# Patient Record
Sex: FEMALE | Race: WHITE | Employment: FULL TIME | URBAN - METROPOLITAN AREA
[De-identification: names, ages, dates, MRNs, and addresses within clinical notes are randomized per-mention and may not be internally consistent; named-entity substitution may affect disease eponyms.]

---

## 2023-12-12 ENCOUNTER — OFFICE VISIT (OUTPATIENT)
Age: 23
End: 2023-12-12
Payer: COMMERCIAL

## 2023-12-12 VITALS
WEIGHT: 208 LBS | RESPIRATION RATE: 20 BRPM | OXYGEN SATURATION: 97 % | TEMPERATURE: 98 F | HEART RATE: 89 BPM | DIASTOLIC BLOOD PRESSURE: 80 MMHG | HEIGHT: 66 IN | SYSTOLIC BLOOD PRESSURE: 118 MMHG | BODY MASS INDEX: 33.43 KG/M2

## 2023-12-12 DIAGNOSIS — Z12.4 CERVICAL CANCER SCREENING: ICD-10-CM

## 2023-12-12 DIAGNOSIS — E28.2 PCOS (POLYCYSTIC OVARIAN SYNDROME): ICD-10-CM

## 2023-12-12 DIAGNOSIS — Z76.89 ENCOUNTER TO ESTABLISH CARE: ICD-10-CM

## 2023-12-12 DIAGNOSIS — F41.9 ANXIETY: ICD-10-CM

## 2023-12-12 DIAGNOSIS — F39 MOOD DISORDER (HCC): ICD-10-CM

## 2023-12-12 DIAGNOSIS — L65.9 HAIR LOSS: ICD-10-CM

## 2023-12-12 DIAGNOSIS — E66.09 CLASS 1 OBESITY DUE TO EXCESS CALORIES WITHOUT SERIOUS COMORBIDITY WITH BODY MASS INDEX (BMI) OF 33.0 TO 33.9 IN ADULT: ICD-10-CM

## 2023-12-12 DIAGNOSIS — E03.9 ACQUIRED HYPOTHYROIDISM: Primary | ICD-10-CM

## 2023-12-12 DIAGNOSIS — K58.2 IRRITABLE BOWEL SYNDROME WITH BOTH CONSTIPATION AND DIARRHEA: ICD-10-CM

## 2023-12-12 PROBLEM — E66.811 CLASS 1 OBESITY DUE TO EXCESS CALORIES WITHOUT SERIOUS COMORBIDITY WITH BODY MASS INDEX (BMI) OF 33.0 TO 33.9 IN ADULT: Status: ACTIVE | Noted: 2023-12-12

## 2023-12-12 PROCEDURE — 1036F TOBACCO NON-USER: CPT | Performed by: NURSE PRACTITIONER

## 2023-12-12 PROCEDURE — G8484 FLU IMMUNIZE NO ADMIN: HCPCS | Performed by: NURSE PRACTITIONER

## 2023-12-12 PROCEDURE — G8417 CALC BMI ABV UP PARAM F/U: HCPCS | Performed by: NURSE PRACTITIONER

## 2023-12-12 PROCEDURE — 99204 OFFICE O/P NEW MOD 45 MIN: CPT | Performed by: NURSE PRACTITIONER

## 2023-12-12 PROCEDURE — G8427 DOCREV CUR MEDS BY ELIG CLIN: HCPCS | Performed by: NURSE PRACTITIONER

## 2023-12-12 RX ORDER — LEVOTHYROXINE SODIUM 0.05 MG/1
TABLET ORAL
COMMUNITY
Start: 2023-12-04

## 2023-12-12 RX ORDER — SPIRONOLACTONE 50 MG/1
50 TABLET, FILM COATED ORAL
COMMUNITY
Start: 2023-09-12 | End: 2023-12-11

## 2023-12-12 RX ORDER — NORETHINDRONE ACETATE AND ETHINYL ESTRADIOL AND FERROUS FUMARATE 1.5-30(21)
KIT ORAL
COMMUNITY
Start: 2023-09-21

## 2023-12-12 RX ORDER — ARIPIPRAZOLE 2 MG/1
TABLET ORAL
COMMUNITY
Start: 2023-11-08

## 2023-12-12 RX ORDER — SPIRONOLACTONE 50 MG/1
50 TABLET, FILM COATED ORAL DAILY
COMMUNITY

## 2023-12-12 RX ORDER — VORTIOXETINE 20 MG/1
TABLET, FILM COATED ORAL
COMMUNITY
Start: 2023-11-08

## 2023-12-12 SDOH — ECONOMIC STABILITY: FOOD INSECURITY: WITHIN THE PAST 12 MONTHS, YOU WORRIED THAT YOUR FOOD WOULD RUN OUT BEFORE YOU GOT MONEY TO BUY MORE.: NEVER TRUE

## 2023-12-12 SDOH — ECONOMIC STABILITY: FOOD INSECURITY: WITHIN THE PAST 12 MONTHS, THE FOOD YOU BOUGHT JUST DIDN'T LAST AND YOU DIDN'T HAVE MONEY TO GET MORE.: NEVER TRUE

## 2023-12-12 SDOH — ECONOMIC STABILITY: HOUSING INSECURITY
IN THE LAST 12 MONTHS, WAS THERE A TIME WHEN YOU DID NOT HAVE A STEADY PLACE TO SLEEP OR SLEPT IN A SHELTER (INCLUDING NOW)?: NO

## 2023-12-12 SDOH — ECONOMIC STABILITY: INCOME INSECURITY: HOW HARD IS IT FOR YOU TO PAY FOR THE VERY BASICS LIKE FOOD, HOUSING, MEDICAL CARE, AND HEATING?: NOT HARD AT ALL

## 2023-12-12 ASSESSMENT — PATIENT HEALTH QUESTIONNAIRE - PHQ9
SUM OF ALL RESPONSES TO PHQ9 QUESTIONS 1 & 2: 0
SUM OF ALL RESPONSES TO PHQ QUESTIONS 1-9: 0
SUM OF ALL RESPONSES TO PHQ QUESTIONS 1-9: 0
2. FEELING DOWN, DEPRESSED OR HOPELESS: 0
SUM OF ALL RESPONSES TO PHQ QUESTIONS 1-9: 0
1. LITTLE INTEREST OR PLEASURE IN DOING THINGS: 0
SUM OF ALL RESPONSES TO PHQ QUESTIONS 1-9: 0

## 2023-12-14 DIAGNOSIS — F41.9 ANXIETY: ICD-10-CM

## 2023-12-14 DIAGNOSIS — E28.2 PCOS (POLYCYSTIC OVARIAN SYNDROME): ICD-10-CM

## 2023-12-14 DIAGNOSIS — L65.9 HAIR LOSS: ICD-10-CM

## 2023-12-14 DIAGNOSIS — E66.09 CLASS 1 OBESITY DUE TO EXCESS CALORIES WITHOUT SERIOUS COMORBIDITY WITH BODY MASS INDEX (BMI) OF 33.0 TO 33.9 IN ADULT: ICD-10-CM

## 2023-12-14 DIAGNOSIS — K58.2 IRRITABLE BOWEL SYNDROME WITH BOTH CONSTIPATION AND DIARRHEA: ICD-10-CM

## 2023-12-14 DIAGNOSIS — E03.9 ACQUIRED HYPOTHYROIDISM: ICD-10-CM

## 2023-12-14 LAB
ALBUMIN SERPL-MCNC: 3.3 G/DL (ref 3.5–5)
ALBUMIN/GLOB SERPL: 0.9 (ref 1.1–2.2)
ALP SERPL-CCNC: 62 U/L (ref 45–117)
ALT SERPL-CCNC: 27 U/L (ref 12–78)
ANION GAP SERPL CALC-SCNC: 7 MMOL/L (ref 5–15)
AST SERPL-CCNC: 16 U/L (ref 15–37)
BASOPHILS # BLD: 0 K/UL (ref 0–0.1)
BASOPHILS NFR BLD: 0 % (ref 0–1)
BILIRUB SERPL-MCNC: 0.3 MG/DL (ref 0.2–1)
BUN SERPL-MCNC: 16 MG/DL (ref 6–20)
BUN/CREAT SERPL: 17 (ref 12–20)
CALCIUM SERPL-MCNC: 9.1 MG/DL (ref 8.5–10.1)
CHLORIDE SERPL-SCNC: 106 MMOL/L (ref 97–108)
CHOLEST SERPL-MCNC: 267 MG/DL
CO2 SERPL-SCNC: 26 MMOL/L (ref 21–32)
CREAT SERPL-MCNC: 0.92 MG/DL (ref 0.55–1.02)
DIFFERENTIAL METHOD BLD: ABNORMAL
EOSINOPHIL # BLD: 0.2 K/UL (ref 0–0.4)
EOSINOPHIL NFR BLD: 3 % (ref 0–7)
ERYTHROCYTE [DISTWIDTH] IN BLOOD BY AUTOMATED COUNT: 12 % (ref 11.5–14.5)
EST. AVERAGE GLUCOSE BLD GHB EST-MCNC: 88 MG/DL
GLOBULIN SER CALC-MCNC: 3.7 G/DL (ref 2–4)
GLUCOSE SERPL-MCNC: 86 MG/DL (ref 65–100)
HBA1C MFR BLD: 4.7 % (ref 4–5.6)
HCT VFR BLD AUTO: 43.6 % (ref 35–47)
HDLC SERPL-MCNC: 55 MG/DL
HDLC SERPL: 4.9 (ref 0–5)
HGB BLD-MCNC: 14.8 G/DL (ref 11.5–16)
IMM GRANULOCYTES # BLD AUTO: 0 K/UL (ref 0–0.04)
IMM GRANULOCYTES NFR BLD AUTO: 1 % (ref 0–0.5)
LDLC SERPL CALC-MCNC: ABNORMAL MG/DL (ref 0–100)
LDLC SERPL DIRECT ASSAY-MCNC: 138 MG/DL (ref 0–100)
LYMPHOCYTES # BLD: 2.6 K/UL (ref 0.8–3.5)
LYMPHOCYTES NFR BLD: 35 % (ref 12–49)
MCH RBC QN AUTO: 29.5 PG (ref 26–34)
MCHC RBC AUTO-ENTMCNC: 33.9 G/DL (ref 30–36.5)
MCV RBC AUTO: 86.9 FL (ref 80–99)
MONOCYTES # BLD: 0.6 K/UL (ref 0–1)
MONOCYTES NFR BLD: 8 % (ref 5–13)
NEUTS SEG # BLD: 4.1 K/UL (ref 1.8–8)
NEUTS SEG NFR BLD: 53 % (ref 32–75)
NRBC # BLD: 0 K/UL (ref 0–0.01)
NRBC BLD-RTO: 0 PER 100 WBC
PLATELET # BLD AUTO: 369 K/UL (ref 150–400)
PMV BLD AUTO: 8.8 FL (ref 8.9–12.9)
POTASSIUM SERPL-SCNC: 4.6 MMOL/L (ref 3.5–5.1)
PROT SERPL-MCNC: 7 G/DL (ref 6.4–8.2)
RBC # BLD AUTO: 5.02 M/UL (ref 3.8–5.2)
SODIUM SERPL-SCNC: 139 MMOL/L (ref 136–145)
TRIGL SERPL-MCNC: 581 MG/DL
TSH SERPL DL<=0.05 MIU/L-ACNC: 2.83 UIU/ML (ref 0.36–3.74)
VLDLC SERPL CALC-MCNC: ABNORMAL MG/DL
WBC # BLD AUTO: 7.5 K/UL (ref 3.6–11)

## 2023-12-14 RX ORDER — OMEGA-3-ACID ETHYL ESTERS 1 G/1
2 CAPSULE, LIQUID FILLED ORAL 2 TIMES DAILY
Qty: 60 CAPSULE | Refills: 3 | Status: SHIPPED | OUTPATIENT
Start: 2023-12-14

## 2023-12-15 ENCOUNTER — OFFICE VISIT (OUTPATIENT)
Age: 23
End: 2023-12-15
Payer: COMMERCIAL

## 2023-12-15 ENCOUNTER — TELEPHONE (OUTPATIENT)
Age: 23
End: 2023-12-15

## 2023-12-15 VITALS
SYSTOLIC BLOOD PRESSURE: 127 MMHG | DIASTOLIC BLOOD PRESSURE: 79 MMHG | WEIGHT: 208 LBS | HEART RATE: 114 BPM | HEIGHT: 66 IN | BODY MASS INDEX: 33.43 KG/M2

## 2023-12-15 DIAGNOSIS — N92.6 IRREGULAR MENSES: Primary | ICD-10-CM

## 2023-12-15 DIAGNOSIS — E28.2 PCOS (POLYCYSTIC OVARIAN SYNDROME): ICD-10-CM

## 2023-12-15 PROCEDURE — G8427 DOCREV CUR MEDS BY ELIG CLIN: HCPCS | Performed by: STUDENT IN AN ORGANIZED HEALTH CARE EDUCATION/TRAINING PROGRAM

## 2023-12-15 PROCEDURE — 99204 OFFICE O/P NEW MOD 45 MIN: CPT | Performed by: STUDENT IN AN ORGANIZED HEALTH CARE EDUCATION/TRAINING PROGRAM

## 2023-12-15 PROCEDURE — G8484 FLU IMMUNIZE NO ADMIN: HCPCS | Performed by: STUDENT IN AN ORGANIZED HEALTH CARE EDUCATION/TRAINING PROGRAM

## 2023-12-15 PROCEDURE — G8417 CALC BMI ABV UP PARAM F/U: HCPCS | Performed by: STUDENT IN AN ORGANIZED HEALTH CARE EDUCATION/TRAINING PROGRAM

## 2023-12-15 PROCEDURE — 1036F TOBACCO NON-USER: CPT | Performed by: STUDENT IN AN ORGANIZED HEALTH CARE EDUCATION/TRAINING PROGRAM

## 2023-12-15 NOTE — PROGRESS NOTES
OB/GYN Problem VIsit    HPI  Jessika Das is a ,  20 y.o. female who presents for a problem visit. Carries diagnosis of polycystic ovarian system as diagnosed in her teens. Patient previously informed of elevated testosterone level. Reports that when she is not on oral contraceptives she will have very infrequent menses, approximately 3 a year. Has been on combined OCPs for several years. On OCPs menses are regular and light in flow. Denies any pelvic pain. Struggles with unwanted hair growth. Previously prescribed spironolactone and this seems to have helped moderately. Has not undergone laser hair removal.  Previously attempted multiple strategies for weight loss. Met with dietitian, trialed semaglutide with primary care provider and had significant nausea. Reports approximately 60 pound weight gain over the past couple years. Also carries diagnosis of hypothyroidism on Synthroid, TSH tested with primary care provider last week within normal limits. Past Medical History:   Diagnosis Date    Anxiety     High cholesterol     Hypothyroidism     IBS (irritable bowel syndrome)     PCOS (polycystic ovarian syndrome)      History reviewed. No pertinent surgical history. Social History     Occupational History    Not on file   Tobacco Use    Smoking status: Never    Smokeless tobacco: Never   Vaping Use    Vaping Use: Never used   Substance and Sexual Activity    Alcohol use: Yes     Alcohol/week: 1.0 standard drink of alcohol     Types: 1 Drinks containing 0.5 oz of alcohol per week    Drug use: Never    Sexual activity: Yes     Partners: Male     Birth control/protection: OCP     Comment: Zeina Pruett     Family History   Problem Relation Age of Onset    Hypothyroidism Mother     High Blood Pressure Father     Elevated Lipids Father     Prostate Cancer Father        No Known Allergies  Prior to Admission medications    Medication Sig Start Date End Date Taking?  Authorizing Provider   omega-3 acid

## 2023-12-15 NOTE — TELEPHONE ENCOUNTER
Called patient to inform them of their last labs. PCP has responded     \"Please call patient. Labs are back and triglycerides are very high  and often comes with psych medications. I sent Prescription to pharmacy to start an Omega 3 fish oil . This will reduce cholesterol and triglycerides. Anabela Rahman \"    Patient stated that she has been on a prescription fish oil from her cardio doctor and isn't sure if new fish oil prescription will help otherwise patient understood.

## 2023-12-15 NOTE — PROGRESS NOTES
Vic Bazan is a 20 y.o. female presents for a problem visit. Chief Complaint   Patient presents with    Polycystic Ovarian Syndrome     Patient's last menstrual period was 12/06/2023 (within days). Birth Control: OCP (estrogen/progesterone). Last Pap: normal obtained 3 year(s) ago, per pt. The patient is reporting having PCOS  The patient has weight gain, irregular cycles (she will not have a menstrual cycle without hormones), fatigue, hair loss and unwanted hair. She has not had any testing previously to officially be dx with PCOS. The patient desires something to help regulate/control PCOS sx.         Examination chaperoned by Yefri High MA.

## 2024-01-11 NOTE — PROGRESS NOTES
Jennie Handley is a 23 y.o. female returns for an annual exam     Chief Complaint   Patient presents with    Annual Exam       Patient's last menstrual period was 01/02/2024 (exact date).  Her periods are moderate in flow and usually regular with a 26-32 day interval with 3-7 day duration while on OCPs.  She does not have dysmenorrhea.  Problems: wants to discuss previous hormone lab results.  Birth Control: OCP (estrogen/progesterone).  Last Pap: normal obtained 3 year(s) ago, per pt.    She does not have a history of ISABEL 2, 3 or cervical cancer.   With regard to the Gardisil vaccine, she has received all 3 injections      1. Have you been to the ER, urgent care clinic, or hospitalized since your last visit? No    2. Have you seen or consulted any other health care providers outside of the [facility] since your last visit? No    Examination chaperoned by Patricia Saab MA.

## 2024-01-12 ENCOUNTER — OFFICE VISIT (OUTPATIENT)
Age: 24
End: 2024-01-12
Payer: COMMERCIAL

## 2024-01-12 VITALS
WEIGHT: 211 LBS | BODY MASS INDEX: 34.06 KG/M2 | HEART RATE: 102 BPM | DIASTOLIC BLOOD PRESSURE: 76 MMHG | SYSTOLIC BLOOD PRESSURE: 118 MMHG

## 2024-01-12 DIAGNOSIS — Z11.3 SCREENING EXAMINATION FOR STD (SEXUALLY TRANSMITTED DISEASE): ICD-10-CM

## 2024-01-12 DIAGNOSIS — E28.2 PCOS (POLYCYSTIC OVARIAN SYNDROME): ICD-10-CM

## 2024-01-12 DIAGNOSIS — Z01.419 ENCOUNTER FOR GYNECOLOGICAL EXAMINATION WITHOUT ABNORMAL FINDING: Primary | ICD-10-CM

## 2024-01-12 DIAGNOSIS — Z12.4 CERVICAL CANCER SCREENING: ICD-10-CM

## 2024-01-12 PROCEDURE — 99395 PREV VISIT EST AGE 18-39: CPT | Performed by: STUDENT IN AN ORGANIZED HEALTH CARE EDUCATION/TRAINING PROGRAM

## 2024-01-12 PROCEDURE — G8484 FLU IMMUNIZE NO ADMIN: HCPCS | Performed by: STUDENT IN AN ORGANIZED HEALTH CARE EDUCATION/TRAINING PROGRAM

## 2024-01-12 RX ORDER — MINOXIDIL 2.5 MG/1
TABLET ORAL
COMMUNITY
Start: 2023-12-14

## 2024-01-12 RX ORDER — NORETHINDRONE ACETATE AND ETHINYL ESTRADIOL AND FERROUS FUMARATE 1.5-30(21)
KIT ORAL
Qty: 3 PACKET | Refills: 3 | Status: SHIPPED | OUTPATIENT
Start: 2024-01-12

## 2024-01-12 RX ORDER — SPIRONOLACTONE 50 MG/1
50 TABLET, FILM COATED ORAL 2 TIMES DAILY
Qty: 60 TABLET | Refills: 11 | Status: SHIPPED | OUTPATIENT
Start: 2024-01-12

## 2024-01-12 NOTE — PROGRESS NOTES
Annual exam ages 18-39    Jennie Handley is a ,  23 y.o. female   Patient's last menstrual period was 2024 (exact date).    She presents for her annual checkup.     She is having problems - struggles with weight gain.  Unwanted hair growth on chin.    With regard to the Gardasil vaccine, series completed.      Menstrual status:    Her periods are light, moderate in flow. She is using three to five pads or tampons per day, usually regular and last 26-30 days.    She does not have dysmenorrhea.    She reports no premenstrual symptoms.    Contraception:    The current method of family planning is oral contraceptives (estrogen/progesterone).    Sexual history:    She  reports being sexually active and has had partner(s) who are male. She reports using the following method of birth control/protection: OCP.    Medical conditions:    Since her last annual GYN exam about 3 years ago, she has not the following changes in her health history: none.     Surgical history confirmed with patient.  has no past surgical history on file.    Pap and Mammogram History:    Her most recent Pap smear was was normal, obtained 3 year(s) ago.    The patient has never had a mammogram.    Breast Cancer History/Substance Abuse: negative    Past Medical History:   Diagnosis Date    Anxiety     High cholesterol     Hypothyroidism     IBS (irritable bowel syndrome)     PCOS (polycystic ovarian syndrome)      History reviewed. No pertinent surgical history.    Current Outpatient Medications   Medication Sig Dispense Refill    minoxidil (LONITEN) 2.5 MG tablet       JUNEL FE 1.5/30 1.5-30 MG-MCG tablet TAKE 1 TABLET BY MOUTH EVERY DAY FOR 28 DAYS 90 DAYS 3 packet 3    spironolactone (ALDACTONE) 50 MG tablet Take 1 tablet by mouth 2 times daily 60 tablet 11    omega-3 acid ethyl esters (LOVAZA) 1 g capsule Take 2 capsules by mouth 2 times daily 60 capsule 3    ARIPiprazole (ABILIFY) 2 MG tablet       levothyroxine (SYNTHROID) 50 MCG  Doxepin Pregnancy And Lactation Text: This medication is Pregnancy Category C and it isn't known if it is safe during pregnancy. It is also excreted in breast milk and breast feeding isn't recommended.

## 2024-01-13 LAB
HBV SURFACE AG SER QL: 0.4 INDEX
HBV SURFACE AG SER QL: NEGATIVE
HCV AB SER IA-ACNC: 0.16 INDEX
HCV AB SERPL QL IA: NONREACTIVE
HIV 1+2 AB+HIV1 P24 AG SERPL QL IA: NONREACTIVE
HIV 1/2 RESULT COMMENT: NORMAL

## 2024-01-15 LAB — RPR SER QL: NONREACTIVE

## 2024-01-16 LAB
., LABCORP: NORMAL
C TRACH RRNA CVX QL NAA+PROBE: NEGATIVE
CYTOLOGIST CVX/VAG CYTO: NORMAL
CYTOLOGY CVX/VAG DOC CYTO: NORMAL
CYTOLOGY CVX/VAG DOC THIN PREP: NORMAL
DX ICD CODE: NORMAL
Lab: NORMAL
N GONORRHOEA RRNA CVX QL NAA+PROBE: NEGATIVE
OTHER STN SPEC: NORMAL
STAT OF ADQ CVX/VAG CYTO-IMP: NORMAL
T VAGINALIS RRNA SPEC QL NAA+PROBE: NEGATIVE

## 2024-03-11 ENCOUNTER — TELEPHONE (OUTPATIENT)
Age: 24
End: 2024-03-11

## 2024-03-11 NOTE — TELEPHONE ENCOUNTER
----- Message from Di Lora MA sent at 3/6/2024 11:52 AM EST -----  Subject: Message to Provider    QUESTIONS  Information for Provider? Requesting recent OV and labs be sent to [facility] [phone]. They need records before appt   ---------------------------------------------------------------------------  --------------  CALL BACK INFO  [phone]; Do not leave any message, patient will call back for answer  ---------------------------------------------------------------------------  --------------  SCRIPT ANSWERS  Relationship to Patient? Self

## 2024-03-11 NOTE — TELEPHONE ENCOUNTER
Last office visit information and recent labs faxed [facility], Per patients request. Faxed to number provided: [phone]    Isaias KU LPN

## 2024-03-12 ENCOUNTER — OFFICE VISIT (OUTPATIENT)
Age: 24
End: 2024-03-12
Payer: COMMERCIAL

## 2024-03-12 VITALS
RESPIRATION RATE: 18 BRPM | SYSTOLIC BLOOD PRESSURE: 123 MMHG | DIASTOLIC BLOOD PRESSURE: 85 MMHG | HEIGHT: 66 IN | BODY MASS INDEX: 34.65 KG/M2 | HEART RATE: 97 BPM | WEIGHT: 215.6 LBS | OXYGEN SATURATION: 96 % | TEMPERATURE: 97.6 F

## 2024-03-12 DIAGNOSIS — E28.2 PCOS (POLYCYSTIC OVARIAN SYNDROME): ICD-10-CM

## 2024-03-12 DIAGNOSIS — F39 MOOD DISORDER (HCC): ICD-10-CM

## 2024-03-12 DIAGNOSIS — E66.09 CLASS 1 OBESITY DUE TO EXCESS CALORIES WITHOUT SERIOUS COMORBIDITY WITH BODY MASS INDEX (BMI) OF 33.0 TO 33.9 IN ADULT: ICD-10-CM

## 2024-03-12 DIAGNOSIS — E03.9 ACQUIRED HYPOTHYROIDISM: Primary | ICD-10-CM

## 2024-03-12 PROCEDURE — G8484 FLU IMMUNIZE NO ADMIN: HCPCS | Performed by: NURSE PRACTITIONER

## 2024-03-12 PROCEDURE — 99213 OFFICE O/P EST LOW 20 MIN: CPT | Performed by: NURSE PRACTITIONER

## 2024-03-12 PROCEDURE — G8417 CALC BMI ABV UP PARAM F/U: HCPCS | Performed by: NURSE PRACTITIONER

## 2024-03-12 PROCEDURE — G8427 DOCREV CUR MEDS BY ELIG CLIN: HCPCS | Performed by: NURSE PRACTITIONER

## 2024-03-12 PROCEDURE — 1036F TOBACCO NON-USER: CPT | Performed by: NURSE PRACTITIONER

## 2024-03-12 RX ORDER — HYOSCYAMINE SULFATE 0.125 MG
TABLET,DISINTEGRATING ORAL
COMMUNITY
Start: 2024-02-23

## 2024-03-12 ASSESSMENT — PATIENT HEALTH QUESTIONNAIRE - PHQ9
1. LITTLE INTEREST OR PLEASURE IN DOING THINGS: 0
SUM OF ALL RESPONSES TO PHQ QUESTIONS 1-9: 0
SUM OF ALL RESPONSES TO PHQ9 QUESTIONS 1 & 2: 0
2. FEELING DOWN, DEPRESSED OR HOPELESS: 0

## 2024-03-12 ASSESSMENT — ENCOUNTER SYMPTOMS
DIARRHEA: 1
SHORTNESS OF BREATH: 0
COUGH: 0
CONSTIPATION: 0
ABDOMINAL PAIN: 1

## 2024-03-12 NOTE — PROGRESS NOTES
I have reviewed all needed documentation in preparation for visit. Verified patient by name and date of birth    Chief Complaint   Patient presents with    Follow-up     Follow up from last appointment, needs referral for endo , wants to discuss weight loss       \"Have you been to the ER, urgent care clinic since your last visit?  Hospitalized since your last visit?\"    NO    “Have you seen or consulted any other health care providers outside of [facility] since your last visit?”    Yes, Gastro  Dr Harrell  about 6 weeks ago               Vitals:    03/12/24 0800   BP: 123/85   Site: Left Upper Arm   Position: Sitting   Cuff Size: Medium Adult   Pulse: 97   Resp: 18   Temp: 97.6 °F (36.4 °C)   TempSrc: Temporal   SpO2: 96%   Weight: 97.8 kg (215 lb 9.6 oz)   Height: 1.676 m (5' 6\")       Health Maintenance Due   Topic Date Due    Hepatitis B vaccine (1 of 3 - 3-dose series) Never done    COVID-19 Vaccine (1) Never done    Varicella vaccine (1 of 2 - 2-dose childhood series) Never done    HPV vaccine (1 - 2-dose series) Never done    DTaP/Tdap/Td vaccine (1 - Tdap) Never done       Malathi Cramer LPN

## 2024-03-12 NOTE — PROGRESS NOTES
Jennie Handley is a 23 y.o. female  Chief Complaint   Patient presents with    Follow-up     Follow up from last appointment, needs referral for endo , wants to discuss weight loss       Vitals:    03/12/24 0800   BP: 123/85   Pulse: 97   Resp: 18   Temp: 97.6 °F (36.4 °C)   SpO2: 96%      Wt Readings from Last 3 Encounters:   03/12/24 97.8 kg (215 lb 9.6 oz)   01/12/24 95.7 kg (211 lb)   12/15/23 94.3 kg (208 lb)     BMI Readings from Last 3 Encounters:   03/12/24 34.80 kg/m²   01/12/24 34.06 kg/m²   12/15/23 33.57 kg/m²     Health Maintenance Due   Topic Date Due    Hepatitis B vaccine (1 of 3 - 3-dose series) Never done    COVID-19 Vaccine (1) Never done    Varicella vaccine (1 of 2 - 2-dose childhood series) Never done    HPV vaccine (1 - 2-dose series) Never done    DTaP/Tdap/Td vaccine (1 - Tdap) Never done     HPI  Patient here to follow up  Hypothyroid  Lab Results   Component Value Date    ORP4RHI 2.83 12/14/2023     Requesting referral for Endocrinology.     Concerns today  Weight loss- GYN said to discuss.   No special diet. Eats clean/ no red meat or Pork. Not Alpha Gal   x yrs.  3-5 times.     Has seen dieticians. All exercise regimens.   Used to be thin 115 lbs age 16.  The weight gain was from panic attacks/ and medications.     Wt Readings from Last 3 Encounters:   03/12/24 97.8 kg (215 lb 9.6 oz)   01/12/24 95.7 kg (211 lb)   12/15/23 94.3 kg (208 lb)       Anxiety- psychiatry/ Trintellex/ Abilify. Has genetic testing for this medication.  PTSD.      Has PCOS. GYN- already    Aldactone   Seen by GI tested for Alpha Gal/ Intestinal bacterial overgrowth..  All normal Waiting for records. ? Colonoscopy in future, Dr. Harrell.       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

## 2024-03-28 ENCOUNTER — OFFICE VISIT (OUTPATIENT)
Age: 24
End: 2024-03-28
Payer: COMMERCIAL

## 2024-03-28 VITALS
BODY MASS INDEX: 34.97 KG/M2 | SYSTOLIC BLOOD PRESSURE: 124 MMHG | HEART RATE: 92 BPM | HEIGHT: 66 IN | DIASTOLIC BLOOD PRESSURE: 81 MMHG | RESPIRATION RATE: 18 BRPM | OXYGEN SATURATION: 97 % | WEIGHT: 217.6 LBS | TEMPERATURE: 97.7 F

## 2024-03-28 DIAGNOSIS — B37.2 CANDIDIASIS, INTERTRIGO: Primary | ICD-10-CM

## 2024-03-28 DIAGNOSIS — L30.9 DERMATITIS: ICD-10-CM

## 2024-03-28 PROCEDURE — G8427 DOCREV CUR MEDS BY ELIG CLIN: HCPCS | Performed by: NURSE PRACTITIONER

## 2024-03-28 PROCEDURE — 99213 OFFICE O/P EST LOW 20 MIN: CPT | Performed by: NURSE PRACTITIONER

## 2024-03-28 PROCEDURE — 1036F TOBACCO NON-USER: CPT | Performed by: NURSE PRACTITIONER

## 2024-03-28 PROCEDURE — G8484 FLU IMMUNIZE NO ADMIN: HCPCS | Performed by: NURSE PRACTITIONER

## 2024-03-28 PROCEDURE — G8417 CALC BMI ABV UP PARAM F/U: HCPCS | Performed by: NURSE PRACTITIONER

## 2024-03-28 RX ORDER — CLOTRIMAZOLE AND BETAMETHASONE DIPROPIONATE 10; .64 MG/G; MG/G
CREAM TOPICAL
Qty: 45 G | Refills: 1 | Status: SHIPPED | OUTPATIENT
Start: 2024-03-28

## 2024-03-28 NOTE — PROGRESS NOTES
Jennie Handley is a 23 y.o. female  Chief Complaint   Patient presents with    Other     Painfull spot in left armpit about 5 or 6 days  painful to the touch       Vitals:    03/28/24 1135   BP: 124/81   Pulse: 92   Resp: 18   Temp: 97.7 °F (36.5 °C)   SpO2: 97%      Wt Readings from Last 3 Encounters:   03/28/24 98.7 kg (217 lb 9.6 oz)   03/12/24 97.8 kg (215 lb 9.6 oz)   01/12/24 95.7 kg (211 lb)     BMI Readings from Last 3 Encounters:   03/28/24 35.12 kg/m²   03/12/24 34.80 kg/m²   01/12/24 34.06 kg/m²     Health Maintenance Due   Topic Date Due    Hepatitis B vaccine (1 of 3 - 3-dose series) Never done    COVID-19 Vaccine (1) Never done    Varicella vaccine (1 of 2 - 2-dose childhood series) Never done    HPV vaccine (1 - 2-dose series) Never done    DTaP/Tdap/Td vaccine (1 - Tdap) Never done     HPI  Patient here with painful lesion under left arm  Started 5 days ago.   Noticed because of Arm pain/ pain to touch.  Stings like sunburn.  Red to purple. Not getting bigger.   No drainage  No streaking or fever.  No history of skin infections    ROS  Review of Systems   Constitutional:  Negative for fever.   Musculoskeletal:  Negative for myalgias.   Skin:  Positive for rash.   Neurological:  Negative for headaches.   Hematological:  Does not bruise/bleed easily.   Psychiatric/Behavioral:  Negative for dysphoric mood.        EXAM  Physical Exam  Vitals and nursing note reviewed.   Constitutional:       General: She is not in acute distress.     Appearance: Normal appearance.   HENT:      Head: Normocephalic and atraumatic.   Eyes:      Extraocular Movements: Extraocular movements intact.      Pupils: Pupils are equal, round, and reactive to light.   Cardiovascular:      Rate and Rhythm: Normal rate.   Pulmonary:      Effort: Pulmonary effort is normal.   Musculoskeletal:         General: Normal range of motion.      Cervical back: Normal range of motion.   Skin:     Findings: Rash present.      Comments: 3 cm

## 2024-03-28 NOTE — PROGRESS NOTES
I have reviewed all needed documentation in preparation for visit. Verified patient by name and date of birth    Chief Complaint   Patient presents with    Other     Painfull spot in left armpit about 5 or 6 days  painful to the touch       \"Have you been to the ER, urgent care clinic since your last visit?  Hospitalized since your last visit?\"    NO    “Have you seen or consulted any other health care providers outside of [facility] since your last visit?”    Yes, dermatologist, Dr Church            Click Here for Release of Records Request    Vitals:    03/28/24 1135   BP: 124/81   Site: Right Upper Arm   Position: Sitting   Cuff Size: Large Adult   Pulse: 92   Resp: 18   Temp: 97.7 °F (36.5 °C)   TempSrc: Temporal   SpO2: 97%   Weight: 98.7 kg (217 lb 9.6 oz)   Height: 1.676 m (5' 6\")       Health Maintenance Due   Topic Date Due    Hepatitis B vaccine (1 of 3 - 3-dose series) Never done    COVID-19 Vaccine (1) Never done    Varicella vaccine (1 of 2 - 2-dose childhood series) Never done    HPV vaccine (1 - 2-dose series) Never done    DTaP/Tdap/Td vaccine (1 - Tdap) Never done       Malathi Cramer LPN

## 2024-05-09 ENCOUNTER — OFFICE VISIT (OUTPATIENT)
Age: 24
End: 2024-05-09
Payer: COMMERCIAL

## 2024-05-09 VITALS
BODY MASS INDEX: 34.55 KG/M2 | OXYGEN SATURATION: 98 % | HEART RATE: 90 BPM | WEIGHT: 215 LBS | SYSTOLIC BLOOD PRESSURE: 120 MMHG | HEIGHT: 66 IN | RESPIRATION RATE: 18 BRPM | DIASTOLIC BLOOD PRESSURE: 79 MMHG | TEMPERATURE: 98 F

## 2024-05-09 DIAGNOSIS — J02.9 VIRAL PHARYNGITIS: Primary | ICD-10-CM

## 2024-05-09 PROBLEM — F41.9 ANXIETY: Status: ACTIVE | Noted: 2024-05-09

## 2024-05-09 LAB
GROUP A STREP ANTIGEN, POC: NEGATIVE
VALID INTERNAL CONTROL, POC: YES

## 2024-05-09 PROCEDURE — G8417 CALC BMI ABV UP PARAM F/U: HCPCS | Performed by: NURSE PRACTITIONER

## 2024-05-09 PROCEDURE — 1036F TOBACCO NON-USER: CPT | Performed by: NURSE PRACTITIONER

## 2024-05-09 PROCEDURE — 87880 STREP A ASSAY W/OPTIC: CPT | Performed by: NURSE PRACTITIONER

## 2024-05-09 PROCEDURE — G8427 DOCREV CUR MEDS BY ELIG CLIN: HCPCS | Performed by: NURSE PRACTITIONER

## 2024-05-09 PROCEDURE — 99213 OFFICE O/P EST LOW 20 MIN: CPT | Performed by: NURSE PRACTITIONER

## 2024-05-09 ASSESSMENT — ENCOUNTER SYMPTOMS
RHINORRHEA: 0
TROUBLE SWALLOWING: 1
SORE THROAT: 1
VOICE CHANGE: 0
COUGH: 0

## 2024-05-09 NOTE — PROGRESS NOTES
I have reviewed all needed documentation in preparation for visit. Verified patient by name and date of birth  Chief Complaint   Patient presents with    OTHER     Start 2 weeks ago- throat is been worse since monday-        Vitals:    05/09/24 0829   BP: 120/79   Site: Right Upper Arm   Position: Sitting   Cuff Size: Medium Adult   Pulse: 90   Resp: 18   Temp: 98 °F (36.7 °C)   TempSrc: Temporal   SpO2: 98%   Weight: 97.5 kg (215 lb)   Height: 1.676 m (5' 6\")       Health Maintenance Due   Topic Date Due    Hepatitis B vaccine (1 of 3 - 3-dose series) Never done    COVID-19 Vaccine (1) Never done    Varicella vaccine (1 of 2 - 2-dose childhood series) Never done    HPV vaccine (1 - 2-dose series) Never done    DTaP/Tdap/Td vaccine (1 - Tdap) Never done     \"Have you been to the ER, urgent care clinic since your last visit?  Hospitalized since your last visit?\"    NO    “Have you seen or consulted any other health care providers outside of [facility] since your last visit?”    NO            Click Here for Release of Records Request         Carla butler [facility]  
(Chloraseptic spray, etc) are “numbing.”    Call if worse / fever/ trouble breathing

## 2024-05-09 NOTE — PATIENT INSTRUCTIONS
Fuids and rest. Rely on remedies such as ice chips...warm broth...hard candy...or a saltwater gargle. A hot shower or cool-mist humidifier may also help.    Plain honey to coat the throat...up to 2 teaspoons at bedtime or as needed...especially if the patient also has a cough. But remind to avoid honey in kids under 1 year due to botulism risk.    Educate that the honey in “hot toddies” is likely why they may help...not the alcohol.    Recommend OTC acetaminophen or an NSAID if needed.    Keep in mind that lozenges, sprays, or gargles may give short-term relief and work faster than oral analgesics. But there’s no evidence that one product is most effective...or works better than nondrug measures.    For example, menthol (Vicks VapoDrops, etc) or pectin (Halls Breezers, etc) are “soothing”...and benzocaine (Cepacol, etc), dyclonine (Sucrets), or phenol (Chloraseptic spray, etc) are “numbing.”

## 2024-05-13 ENCOUNTER — OFFICE VISIT (OUTPATIENT)
Age: 24
End: 2024-05-13
Payer: COMMERCIAL

## 2024-05-13 VITALS
WEIGHT: 214 LBS | BODY MASS INDEX: 34.39 KG/M2 | HEIGHT: 66 IN | RESPIRATION RATE: 18 BRPM | DIASTOLIC BLOOD PRESSURE: 72 MMHG | SYSTOLIC BLOOD PRESSURE: 109 MMHG | HEART RATE: 114 BPM | TEMPERATURE: 98.7 F | OXYGEN SATURATION: 96 %

## 2024-05-13 DIAGNOSIS — B34.9 VIRAL ILLNESS: ICD-10-CM

## 2024-05-13 DIAGNOSIS — B96.89 BACTERIAL TONSILLITIS: Primary | ICD-10-CM

## 2024-05-13 DIAGNOSIS — J98.8 CONGESTION OF UPPER AIRWAY: ICD-10-CM

## 2024-05-13 DIAGNOSIS — M79.10 MYALGIA: ICD-10-CM

## 2024-05-13 DIAGNOSIS — J03.80 BACTERIAL TONSILLITIS: Primary | ICD-10-CM

## 2024-05-13 LAB
QUICKVUE INFLUENZA TEST: NORMAL
VALID INTERNAL CONTROL, POC: YES

## 2024-05-13 PROCEDURE — G8427 DOCREV CUR MEDS BY ELIG CLIN: HCPCS | Performed by: NURSE PRACTITIONER

## 2024-05-13 PROCEDURE — 87804 INFLUENZA ASSAY W/OPTIC: CPT | Performed by: NURSE PRACTITIONER

## 2024-05-13 PROCEDURE — G8417 CALC BMI ABV UP PARAM F/U: HCPCS | Performed by: NURSE PRACTITIONER

## 2024-05-13 PROCEDURE — 99213 OFFICE O/P EST LOW 20 MIN: CPT | Performed by: NURSE PRACTITIONER

## 2024-05-13 PROCEDURE — 1036F TOBACCO NON-USER: CPT | Performed by: NURSE PRACTITIONER

## 2024-05-13 RX ORDER — AMOXICILLIN AND CLAVULANATE POTASSIUM 875; 125 MG/1; MG/1
1 TABLET, FILM COATED ORAL 2 TIMES DAILY
Qty: 14 TABLET | Refills: 0 | Status: SHIPPED | OUTPATIENT
Start: 2024-05-13 | End: 2024-05-20

## 2024-05-13 ASSESSMENT — ENCOUNTER SYMPTOMS
TROUBLE SWALLOWING: 0
SORE THROAT: 1
COUGH: 1
RHINORRHEA: 1
EYE PAIN: 0
SINUS PAIN: 0
SHORTNESS OF BREATH: 0

## 2024-05-13 NOTE — PROGRESS NOTES
Jennie Handley is a 24 y.o. female  Chief Complaint   Patient presents with    Pharyngitis     Coughing, sore throat,.pressure in ears,headache,body aches,congestion,mucus- start last Monday- getting worse-        Vitals:    05/13/24 1323   BP: 109/72   Pulse: (!) 114   Resp: 18   Temp: 98.7 °F (37.1 °C)   SpO2: 96%      Wt Readings from Last 3 Encounters:   05/13/24 97.1 kg (214 lb)   05/09/24 97.5 kg (215 lb)   03/28/24 98.7 kg (217 lb 9.6 oz)     BMI Readings from Last 3 Encounters:   05/13/24 34.54 kg/m²   05/09/24 34.70 kg/m²   03/28/24 35.12 kg/m²     Health Maintenance Due   Topic Date Due    Hepatitis B vaccine (1 of 3 - 3-dose series) Never done    COVID-19 Vaccine (1) Never done    Varicella vaccine (1 of 2 - 2-dose childhood series) Never done    HPV vaccine (1 - 2-dose series) Never done    DTaP/Tdap/Td vaccine (1 - Tdap) Never done     HPI  Patient here for  symptoms of URI  Here last week. 5/9/24  Complaint of sore throat. For 2 weeks. Negative strep test.  Instructed OTC treatment.    Here today (4 days later) symptoms of cough. Congestion  Pt reports worsening symptoms  and new myalgias.  Headaches.  Started  over weekend.  Sore throat. A lot of mucous.   Swollen / red.    Tylenol cold and flu.  Dimetapp.   COVID test   ROS  Review of Systems   Constitutional:  Positive for fatigue. Negative for fever.   HENT:  Positive for congestion, postnasal drip, rhinorrhea and sore throat. Negative for ear pain, sinus pain and trouble swallowing.    Eyes:  Negative for pain.   Respiratory:  Positive for cough. Negative for shortness of breath.    Cardiovascular:  Negative for chest pain.   Musculoskeletal:  Positive for myalgias.   Skin:  Negative for rash.   Allergic/Immunologic: Negative for environmental allergies.   Neurological:  Positive for headaches. Negative for dizziness.   Hematological:  Negative for adenopathy.       EXAM  Physical Exam  Vitals and nursing note reviewed.   Constitutional:

## 2024-05-13 NOTE — PROGRESS NOTES
I have reviewed all needed documentation in preparation for visit. Verified patient by name and date of birth  Chief Complaint   Patient presents with    Pharyngitis     Coughing, sore throat,.pressure in ears,headache,body aches,congestion,mucus- start last Monday- getting worse-        Vitals:    05/13/24 1323   BP: 109/72   Site: Right Upper Arm   Position: Sitting   Cuff Size: Medium Adult   Pulse: (!) 114   Resp: 18   Temp: 98.7 °F (37.1 °C)   TempSrc: Temporal   SpO2: 96%   Weight: 97.1 kg (214 lb)   Height: 1.676 m (5' 6\")       Health Maintenance Due   Topic Date Due    Hepatitis B vaccine (1 of 3 - 3-dose series) Never done    COVID-19 Vaccine (1) Never done    Varicella vaccine (1 of 2 - 2-dose childhood series) Never done    HPV vaccine (1 - 2-dose series) Never done    DTaP/Tdap/Td vaccine (1 - Tdap) Never done     \"Have you been to the ER, urgent care clinic since your last visit?  Hospitalized since your last visit?\"    NO    “Have you seen or consulted any other health care providers outside of [facility] since your last visit?”    NO            Click Here for Release of Records Request         Carla butler [facility] [address]

## 2024-05-16 ENCOUNTER — TELEPHONE (OUTPATIENT)
Age: 24
End: 2024-05-16

## 2024-05-16 RX ORDER — AMOXICILLIN 875 MG/1
875 TABLET, COATED ORAL 2 TIMES DAILY
Qty: 14 TABLET | Refills: 0 | Status: SHIPPED | OUTPATIENT
Start: 2024-05-16 | End: 2024-05-23

## 2024-05-16 NOTE — TELEPHONE ENCOUNTER
Patient called ask if she can have medication  amoxicillin-clavulanate (AUGMENTIN) 875-125 MG per tablet [[id number]]    Order Details  Dose: 1 tablet   CHANGED TO A REGULAR AMOXICILLIN BECAUSE SHE'S HAVING  STOMACH PAIN. [phone].

## 2024-05-17 ENCOUNTER — TELEPHONE (OUTPATIENT)
Age: 24
End: 2024-05-17

## 2024-05-17 NOTE — TELEPHONE ENCOUNTER
Pt states that while on prescribed antibiotics she now has a yeast infection and would like to know what she could be prescribe something of if she has to come in to be seen. Pt would like a call back as soon as possible.

## 2024-05-20 ENCOUNTER — OFFICE VISIT (OUTPATIENT)
Age: 24
End: 2024-05-20
Payer: COMMERCIAL

## 2024-05-20 VITALS
WEIGHT: 213 LBS | BODY MASS INDEX: 34.23 KG/M2 | SYSTOLIC BLOOD PRESSURE: 113 MMHG | OXYGEN SATURATION: 96 % | RESPIRATION RATE: 18 BRPM | DIASTOLIC BLOOD PRESSURE: 76 MMHG | HEART RATE: 89 BPM | HEIGHT: 66 IN | TEMPERATURE: 97.3 F

## 2024-05-20 DIAGNOSIS — T36.95XA ANTIBIOTIC-INDUCED YEAST INFECTION: Primary | ICD-10-CM

## 2024-05-20 DIAGNOSIS — B37.9 ANTIBIOTIC-INDUCED YEAST INFECTION: Primary | ICD-10-CM

## 2024-05-20 DIAGNOSIS — R05.2 SUBACUTE COUGH: ICD-10-CM

## 2024-05-20 PROCEDURE — G8427 DOCREV CUR MEDS BY ELIG CLIN: HCPCS | Performed by: NURSE PRACTITIONER

## 2024-05-20 PROCEDURE — 99213 OFFICE O/P EST LOW 20 MIN: CPT | Performed by: NURSE PRACTITIONER

## 2024-05-20 PROCEDURE — G8417 CALC BMI ABV UP PARAM F/U: HCPCS | Performed by: NURSE PRACTITIONER

## 2024-05-20 PROCEDURE — 1036F TOBACCO NON-USER: CPT | Performed by: NURSE PRACTITIONER

## 2024-05-20 RX ORDER — FLUCONAZOLE 150 MG/1
150 TABLET ORAL
Qty: 2 TABLET | Refills: 0 | Status: SHIPPED | OUTPATIENT
Start: 2024-05-20 | End: 2024-05-26

## 2024-05-20 ASSESSMENT — ENCOUNTER SYMPTOMS
SHORTNESS OF BREATH: 0
COUGH: 1
RHINORRHEA: 1

## 2024-05-20 NOTE — PROGRESS NOTES
I have reviewed all needed documentation in preparation for visit. Verified patient by name and date of birth  Chief Complaint   Patient presents with    Vaginitis     Due to antibiotics- start last Wednesday-          Vitals:    05/20/24 0848   BP: 113/76   Site: Right Upper Arm   Position: Sitting   Cuff Size: Medium Adult   Pulse: 89   Resp: 18   Temp: 97.3 °F (36.3 °C)   TempSrc: Temporal   SpO2: 96%   Weight: 96.6 kg (213 lb)   Height: 1.676 m (5' 6\")       Health Maintenance Due   Topic Date Due    Hepatitis B vaccine (1 of 3 - 3-dose series) Never done    COVID-19 Vaccine (1) Never done    Varicella vaccine (1 of 2 - 2-dose childhood series) Never done    HPV vaccine (1 - 2-dose series) Never done    DTaP/Tdap/Td vaccine (1 - Tdap) Never done     \"Have you been to the ER, urgent care clinic since your last visit?  Hospitalized since your last visit?\"    NO    “Have you seen or consulted any other health care providers outside of [facility] since your last visit?”    NO            Click Here for Release of Records Request         Carla butler [facility]

## 2024-05-20 NOTE — PROGRESS NOTES
Jennie Handley is a 24 y.o. female  Chief Complaint   Patient presents with    Vaginitis     Due to antibiotics- start last Wednesday-          Vitals:    05/20/24 0848   BP: 113/76   Pulse: 89   Resp: 18   Temp: 97.3 °F (36.3 °C)   SpO2: 96%      Wt Readings from Last 3 Encounters:   05/20/24 96.6 kg (213 lb)   05/13/24 97.1 kg (214 lb)   05/09/24 97.5 kg (215 lb)     BMI Readings from Last 3 Encounters:   05/20/24 34.38 kg/m²   05/13/24 34.54 kg/m²   05/09/24 34.70 kg/m²     Health Maintenance Due   Topic Date Due    Hepatitis B vaccine (1 of 3 - 3-dose series) Never done    COVID-19 Vaccine (1) Never done    Varicella vaccine (1 of 2 - 2-dose childhood series) Never done    HPV vaccine (1 - 2-dose series) Never done    DTaP/Tdap/Td vaccine (1 - Tdap) Never done     HPI  Patient presents with vaginal discharge and itching.   Stinging.  Recent antibiotic use last week.   Symptoms x 5 days.   No history of fever, pelvic pain, or chronic infections.    Reports malingering cough from recent illness.  No fever/ SOB or wheeze.  + PND  ROS  Review of Systems   Constitutional:  Negative for activity change, chills, fatigue and fever.   HENT:  Positive for postnasal drip and rhinorrhea.    Eyes:  Negative for visual disturbance.   Respiratory:  Positive for cough. Negative for shortness of breath.    Cardiovascular:  Negative for chest pain.   Genitourinary:  Positive for vaginal discharge. Negative for dysuria and pelvic pain.   Musculoskeletal:  Negative for arthralgias.   Neurological:  Negative for headaches.     EXAM  Physical Exam  Vitals and nursing note reviewed.   Constitutional:       General: She is not in acute distress.     Appearance: Normal appearance.   HENT:      Head: Normocephalic and atraumatic.      Nose: Nose normal.      Mouth/Throat:      Mouth: Mucous membranes are moist.   Eyes:      Pupils: Pupils are equal, round, and reactive to light.   Cardiovascular:      Rate and Rhythm: Normal rate.

## 2024-09-04 ENCOUNTER — OFFICE VISIT (OUTPATIENT)
Age: 24
End: 2024-09-04

## 2024-09-04 VITALS
WEIGHT: 213 LBS | BODY MASS INDEX: 34.23 KG/M2 | SYSTOLIC BLOOD PRESSURE: 111 MMHG | HEART RATE: 93 BPM | TEMPERATURE: 98.4 F | HEIGHT: 66 IN | OXYGEN SATURATION: 97 % | DIASTOLIC BLOOD PRESSURE: 78 MMHG | RESPIRATION RATE: 18 BRPM

## 2024-09-04 DIAGNOSIS — T50.Z95A ADVERSE EFFECT OF VACCINE, INITIAL ENCOUNTER: ICD-10-CM

## 2024-09-04 DIAGNOSIS — J02.9 SORE THROAT: Primary | ICD-10-CM

## 2024-09-04 LAB
STREP PYOGENES DNA, POC: NEGATIVE
VALID INTERNAL CONTROL, POC: NORMAL

## 2024-09-04 RX ORDER — LANOLIN ALCOHOL/MO/W.PET/CERES
800 CREAM (GRAM) TOPICAL DAILY
COMMUNITY

## 2024-09-04 RX ORDER — CHLORAL HYDRATE 500 MG
1 CAPSULE ORAL
COMMUNITY

## 2024-09-04 NOTE — PROGRESS NOTES
Jennie Handley (: 2000) is a 24 y.o. female, New patient, here for evaluation of the following chief complaint(s):  Other (Pt c/o that she got a tdap shot in her left arm and  she noticed a knot in the same area the shot was given  and that part of her arm is hard and hurts to touch.), Pharyngitis (Scratchy throat ), Headache, Fatigue, Chills, and Nausea     ASSESSMENT/PLAN:  Below is the assessment and plan developed based on review of pertinent history, physical exam, labs, studies, and medications.  1. Sore throat  -     AMB POC STREP GO A DIRECT, DNA PROBE  2. Adverse effect of vaccine, initial encounter     Vital signs stable  Alert and oriented x3    The patient presents with symptoms likely due to an adverse reaction from the TDAP vaccine. Patient is nontoxic appearing and not in need of emergent medical intervention.    -Provided reassurance and reassessment  -Discussed over-the-counter medications for symptomatic relief  -Provided educational material and patient instructions  -Discharged patient with instructions to follow-up with pediatrician  -Advised to go immediately to the ED for worsening or persistent symptoms      Follow up:  Return in about 5 days (around 2024), or if symptoms worsen or fail to improve.  Follow up immediately for any new, worsening or changes or if symptoms are not improving over the next 5-7 days.     SUBJECTIVE/OBJECTIVE:  24 y.o. female presents with concern for left arm tenderness and bruising at the injection site of a TDAP vaccine she received last week. Three days after receiving the vaccine she developed a sore throat, headache, chills, and intermittent nausea. She has not taken anything for her symptoms. She denies any known sick contacts.       Pharyngitis  Associated symptoms: fatigue and headaches    Headache  Fatigue  Associated symptoms: fatigue and headaches         Diagnoses and all orders for this visit:  Sore throat  -     AMB POC

## 2024-09-04 NOTE — PATIENT INSTRUCTIONS
Thank you for visiting [facility] today.    Strep: Negative     Your symptoms are likely due to an adverse reaction from the TDAP vaccine.     Take an over-the-counter pain medicine, such as acetaminophen (Tylenol), ibuprofen (Advil, Motrin), or naproxen (Aleve) to reduce fever and relieve body aches. Read and follow all instructions on the label. Increase fluid intake.     Please follow up with your PCP if your signs and symptoms persist or worsen.    Please go immediately to the Emergency Department if you develop sensation of throat closing, facial swelling, difficulty swallowing, difficulty breathing, shortness of breath, chest pain, and uncontrolled fever above 100.4F.

## 2024-11-01 RX ORDER — VORTIOXETINE 20 MG/1
20 TABLET, FILM COATED ORAL DAILY
Qty: 90 TABLET | Refills: 0 | Status: SHIPPED | OUTPATIENT
Start: 2024-11-01

## 2024-11-01 NOTE — TELEPHONE ENCOUNTER
Refill request received from [facility]    for   Requested Prescriptions     Pending Prescriptions Disp Refills    TRINTELLIX 20 MG TABS tablet 30 tablet      Last office visit: 5/20/2024   Next office visit: Visit date not found     Routed to Dr Annmarie Hargrove for review.     Malathi Cramer LPN

## 2025-01-03 ENCOUNTER — TELEMEDICINE (OUTPATIENT)
Age: 25
End: 2025-01-03

## 2025-01-03 DIAGNOSIS — J02.9 SORE THROAT: Primary | ICD-10-CM

## 2025-01-03 DIAGNOSIS — K52.9 GASTROENTERITIS: ICD-10-CM

## 2025-01-03 DIAGNOSIS — J11.1 FLU SYNDROME: ICD-10-CM

## 2025-01-03 PROBLEM — N92.1 MENORRHAGIA WITH IRREGULAR CYCLE: Status: ACTIVE | Noted: 2021-08-09

## 2025-01-03 PROBLEM — K58.9 IBS (IRRITABLE BOWEL SYNDROME): Status: ACTIVE | Noted: 2023-05-09

## 2025-01-03 PROBLEM — E78.5 DYSLIPIDEMIA: Status: ACTIVE | Noted: 2023-05-09

## 2025-01-03 PROBLEM — E03.9 ACQUIRED HYPOTHYROIDISM: Status: RESOLVED | Noted: 2023-12-12 | Resolved: 2025-01-03

## 2025-01-03 PROBLEM — R00.0 TACHYCARDIA: Status: ACTIVE | Noted: 2023-05-09

## 2025-01-03 RX ORDER — AMOXICILLIN 875 MG/1
875 TABLET, COATED ORAL 2 TIMES DAILY
Qty: 20 TABLET | Refills: 0 | Status: SHIPPED | OUTPATIENT
Start: 2025-01-03 | End: 2025-01-13

## 2025-01-03 NOTE — PROGRESS NOTES
Jennie Handley is a 24 y.o. female who was seen by synchronous (real-time) audio-video technology on 1/3/2025 for Pharyngitis (Pt states she has white golf balls inn back of throat started yesterday , SXS OF Headache , stuffiness)        Assessment & Plan:   Jennie was seen today for pharyngitis.    Diagnoses and all orders for this visit:    Sore throat    Flu syndrome    Gastroenteritis    Other orders  -     amoxicillin (AMOXIL) 875 MG tablet; Take 1 tablet by mouth 2 times daily for 10 days    Patient reports a history of streptococcal throat infections.  Symptoms consistent with strep.  Treatment with antibiotic.  Instructed patient if not improved in 2 days or worsening throat swelling she should go to the emergency room.  Instructions included salt water gargles, tea with honey, ibuprofen or Tylenol for pain relief.  Take the medication as directed with food.  Recent reports of a gastrointestinal virus.  Recommend a bland diet.  Small frequent meals.  Avoid dairy which is hard to digest.        Subjective:   Pt reports  Noro virus and flu starting Sunday.   Sunday night had diarrhea and vomiting. Monday. Diarrhea all day. + Fever.   Yesterday, took imodium to drive home from [place].     Today c/0 sore throat.  Symptoms started yesterday  Reports    large tonsils.  \"white balls\" on throat.   Headache. Congestion.    No fever. Mom sick.    Is  trouble breathing . Throat \"swollen shut\".       6 month ago had Strep.       Current treatment. Tylenol.        Prior to Admission medications    Medication Sig Start Date End Date Taking? Authorizing Provider   TRINTELLIX 20 MG TABS tablet Take 1 tablet by mouth daily 11/1/24  Yes Annmarie Hargrove,    Omega-3 Fatty Acids (FISH OIL) 1000 MG capsule 1 capsule   Yes Provider, MD Frederick   folic acid (FOLVITE) 400 MCG tablet Take 2 tablets by mouth daily   Yes Provider, MD Frederick   hyoscyamine (OSCIMIN) 125 MCG TBDP dispersible tablet DISSOLVE 1 TAB ONTO THE TONGUE 1

## 2025-01-03 NOTE — PROGRESS NOTES
I have reviewed all needed documentation in preparation for visit. Verified patient by name and date of birth  No chief complaint on file.      There were no vitals filed for this visit.    Health Maintenance Due   Topic Date Due    Varicella vaccine (1 of 2 - 13+ 2-dose series) Never done    HPV vaccine (1 - 3-dose series) Never done    Hepatitis B vaccine (1 of 3 - 19+ 3-dose series) Never done    Flu vaccine (1) Never done    COVID-19 Vaccine (1 - 2023-24 season) Never done     \"Have you been to the ER, urgent care clinic since your last visit?  Hospitalized since your last visit?\"      no  “Have you seen or consulted any other health care providers outside of [facility] since your last visit?”    NO            Click Here for Release of Records Request         Jayjay Light [facility]

## 2025-01-16 NOTE — PROGRESS NOTES
Jennie Handley is a 24 y.o. female returns for an annual exam     Chief Complaint   Patient presents with    Annual Exam       Patient's last menstrual period was 12/31/2024 (exact date).  Her periods are moderate in flow and usually regular with a 26-32 day interval with 3-7 day duration.  She does not have dysmenorrhea.  Problems: no problems  Birth Control: OCP (estrogen/progesterone).  Last Pap: normal obtained 1 year(s) ago.  She does not have a history of ISABEL 2, 3 or cervical cancer.   With regard to the Gardisil vaccine, she has received all 3 injections    1. Have you been to the ER, urgent care clinic, or hospitalized since your last visit? No    2. Have you seen or consulted any other health care providers outside of the [facility] since your last visit? No    Examination chaperoned by Jovana Vidal MA.

## 2025-01-17 ENCOUNTER — OFFICE VISIT (OUTPATIENT)
Age: 25
End: 2025-01-17
Payer: COMMERCIAL

## 2025-01-17 VITALS
SYSTOLIC BLOOD PRESSURE: 116 MMHG | HEART RATE: 96 BPM | HEIGHT: 66 IN | DIASTOLIC BLOOD PRESSURE: 80 MMHG | BODY MASS INDEX: 32.62 KG/M2 | WEIGHT: 203 LBS

## 2025-01-17 DIAGNOSIS — E28.2 PCOS (POLYCYSTIC OVARIAN SYNDROME): ICD-10-CM

## 2025-01-17 PROCEDURE — 99395 PREV VISIT EST AGE 18-39: CPT | Performed by: STUDENT IN AN ORGANIZED HEALTH CARE EDUCATION/TRAINING PROGRAM

## 2025-01-17 RX ORDER — SPIRONOLACTONE 50 MG/1
50 TABLET, FILM COATED ORAL 2 TIMES DAILY
Qty: 60 TABLET | Refills: 11 | Status: SHIPPED | OUTPATIENT
Start: 2025-01-17

## 2025-01-17 RX ORDER — NORETHINDRONE ACETATE AND ETHINYL ESTRADIOL AND FERROUS FUMARATE 1.5-30(21)
KIT ORAL
Qty: 3 PACKET | Refills: 3 | Status: SHIPPED | OUTPATIENT
Start: 2025-01-17

## 2025-01-17 SDOH — ECONOMIC STABILITY: FOOD INSECURITY: WITHIN THE PAST 12 MONTHS, THE FOOD YOU BOUGHT JUST DIDN'T LAST AND YOU DIDN'T HAVE MONEY TO GET MORE.: NEVER TRUE

## 2025-01-17 SDOH — ECONOMIC STABILITY: FOOD INSECURITY: WITHIN THE PAST 12 MONTHS, YOU WORRIED THAT YOUR FOOD WOULD RUN OUT BEFORE YOU GOT MONEY TO BUY MORE.: NEVER TRUE

## 2025-01-17 ASSESSMENT — PATIENT HEALTH QUESTIONNAIRE - PHQ9
SUM OF ALL RESPONSES TO PHQ9 QUESTIONS 1 & 2: 0
1. LITTLE INTEREST OR PLEASURE IN DOING THINGS: NOT AT ALL
SUM OF ALL RESPONSES TO PHQ QUESTIONS 1-9: 0
2. FEELING DOWN, DEPRESSED OR HOPELESS: NOT AT ALL
SUM OF ALL RESPONSES TO PHQ QUESTIONS 1-9: 0

## 2025-01-17 NOTE — PROGRESS NOTES
Annual exam ages 18-39    Jennie Handley is a ,  24 y.o. female   Patient's last menstrual period was 2024 (exact date).    She presents for her annual checkup.     She is having problems - still unhappy with weight and hair growth. Has been taking spironolactone and cOCPs. Menses regular. Hasn't pursued laser hair removal.    With regard to the Gardasil vaccine, series completed.      Menstrual status:    Her periods are light, moderate in flow. She is using three to five pads or tampons per day, usually regular and last 26-30 days.    She does not have dysmenorrhea.    She reports no premenstrual symptoms.    Contraception:    The current method of family planning is oral contraceptives (estrogen/progesterone).    Sexual history:    She  reports that she is not currently sexually active and has had partner(s) who are male. She reports using the following method of birth control/protection: OCP.    Medical conditions:    Since her last annual GYN exam about 1 year ago, she has not the following changes in her health history: none.     Surgical history confirmed with patient.  has no past surgical history on file.    Pap and Mammogram History:    Her most recent Pap smear was was normal, obtained 1 year(s) ago.    The patient has never had a mammogram.    Breast Cancer History/Substance Abuse: negative    Past Medical History:   Diagnosis Date    Anxiety     High cholesterol     Hypothyroidism     IBS (irritable bowel syndrome)     PCOS (polycystic ovarian syndrome)     Psychiatric problem 2017    Anxiety     No past surgical history on file.    Current Outpatient Medications   Medication Sig Dispense Refill    JUNEL FE 1.5/30 1.5-30 MG-MCG tablet TAKE 1 TABLET BY MOUTH EVERY DAY FOR 28 DAYS 90 DAYS 3 packet 3    spironolactone (ALDACTONE) 50 MG tablet Take 1 tablet by mouth 2 times daily 60 tablet 11    TRINTELLIX 20 MG TABS tablet Take 1 tablet by mouth daily 90 tablet 0    Omega-3 Fatty Acids (FISH

## 2025-02-03 RX ORDER — VORTIOXETINE 20 MG/1
20 TABLET, FILM COATED ORAL DAILY
Qty: 90 TABLET | Refills: 2 | Status: SHIPPED | OUTPATIENT
Start: 2025-02-03

## 2025-04-24 ENCOUNTER — OFFICE VISIT (OUTPATIENT)
Age: 25
End: 2025-04-24
Payer: COMMERCIAL

## 2025-04-24 VITALS
RESPIRATION RATE: 20 BRPM | BODY MASS INDEX: 33.93 KG/M2 | TEMPERATURE: 97.7 F | OXYGEN SATURATION: 96 % | HEART RATE: 92 BPM | DIASTOLIC BLOOD PRESSURE: 77 MMHG | SYSTOLIC BLOOD PRESSURE: 116 MMHG | WEIGHT: 210.2 LBS

## 2025-04-24 DIAGNOSIS — J02.9 VIRAL PHARYNGITIS: Primary | ICD-10-CM

## 2025-04-24 DIAGNOSIS — J06.9 VIRAL UPPER RESPIRATORY INFECTION: ICD-10-CM

## 2025-04-24 DIAGNOSIS — J30.1 SEASONAL ALLERGIC RHINITIS DUE TO POLLEN: ICD-10-CM

## 2025-04-24 LAB
GROUP A STREP ANTIGEN, POC: NEGATIVE
VALID INTERNAL CONTROL, POC: NORMAL

## 2025-04-24 PROCEDURE — G8417 CALC BMI ABV UP PARAM F/U: HCPCS | Performed by: NURSE PRACTITIONER

## 2025-04-24 PROCEDURE — G8427 DOCREV CUR MEDS BY ELIG CLIN: HCPCS | Performed by: NURSE PRACTITIONER

## 2025-04-24 PROCEDURE — 1036F TOBACCO NON-USER: CPT | Performed by: NURSE PRACTITIONER

## 2025-04-24 PROCEDURE — 99213 OFFICE O/P EST LOW 20 MIN: CPT | Performed by: NURSE PRACTITIONER

## 2025-04-24 PROCEDURE — 87880 STREP A ASSAY W/OPTIC: CPT | Performed by: NURSE PRACTITIONER

## 2025-04-24 RX ORDER — KETOCONAZOLE 20 MG/ML
SHAMPOO, SUSPENSION TOPICAL
COMMUNITY
Start: 2025-03-27

## 2025-04-24 RX ORDER — CICLOPIROX 1 G/100ML
SHAMPOO TOPICAL
COMMUNITY
Start: 2025-03-27

## 2025-04-24 ASSESSMENT — ENCOUNTER SYMPTOMS
COUGH: 0
SORE THROAT: 1
SHORTNESS OF BREATH: 0
RHINORRHEA: 1
SINUS PRESSURE: 1
TROUBLE SWALLOWING: 0

## 2025-04-24 NOTE — PATIENT INSTRUCTIONS
To treat a sore throat you should take mild pain medicine like acetaminophen or ibuprofen. Increase your fluids, eat a soft diet and do not smoke. Gargling with warm water or salt water (1 tsp. Salt in 8 oz. Water) can be helpful. Throat sprays and lozenges or sucking on hard candy will also ease the symptoms. Call your doctor if your sore throat lasts longer than one week. Call your doctor or the emergency room right away if you have the following symptoms:       Decongestant    Mucinex cold    Zyrtec/allegra   The goal is to treat the congestion and runny nose /  pseudoephedrine for congestion and tylenol or ibuprofen for pain.     Do not mix  multiple over the counter medications without reading the label first.  Avoid dairy products as they can thicken mucous.  Cool mist humidifier in the bedroom.  Drink lots of water and rest.

## 2025-04-24 NOTE — PROGRESS NOTES
Jennie Handley is a 24 y.o. female  Chief Complaint   Patient presents with    Pharyngitis     Scratchy throat, sneezing, swollen tonsils, pain when swallowing started Tues night       Vitals:    04/24/25 1019   BP: 116/77   BP Site: Right Upper Arm   Patient Position: Sitting   BP Cuff Size: Large Adult   Pulse: 92   Resp: 20   Temp: 97.7 °F (36.5 °C)   TempSrc: Temporal   SpO2: 96%   Weight: 95.3 kg (210 lb 3.2 oz)      Wt Readings from Last 3 Encounters:   04/24/25 95.3 kg (210 lb 3.2 oz)   01/17/25 92.1 kg (203 lb)   09/04/24 96.6 kg (213 lb)     BMI Readings from Last 3 Encounters:   04/24/25 33.93 kg/m²   01/17/25 32.77 kg/m²   09/04/24 34.38 kg/m²     Health Maintenance Due   Topic Date Due    Varicella vaccine (1 of 2 - 13+ 2-dose series) Never done    HPV vaccine (1 - 3-dose series) Never done    Hepatitis B vaccine (1 of 3 - 19+ 3-dose series) Never done    COVID-19 Vaccine (1 - 2024-25 season) Never done     HPI  Patient here with a 3 day history of sore throat, sneezing, scratchy throat.   Tues night-  started Throat  scratchy pain, sneezing.   Took benadryl.    Today  Lots of congestion/ scratchy throat/ ear fullness  Negative exposure/ negative COVID test .  Denies fever / denies allergies  Works from home.   Took Tylenol cold and sinus.    ROS  Review of Systems   Constitutional:  Negative for chills, fatigue and fever.   HENT:  Positive for congestion, postnasal drip, rhinorrhea, sinus pressure and sore throat. Negative for ear discharge, hearing loss and trouble swallowing.    Eyes:  Negative for visual disturbance.   Respiratory:  Negative for cough and shortness of breath.    Cardiovascular:  Negative for chest pain and palpitations.   Musculoskeletal:  Negative for myalgias.   Allergic/Immunologic: Positive for environmental allergies.   Neurological:  Negative for dizziness, syncope and headaches.       EXAM  Physical Exam  Vitals and nursing note reviewed.   Constitutional:       General: She

## 2025-05-13 ENCOUNTER — OFFICE VISIT (OUTPATIENT)
Age: 25
End: 2025-05-13
Payer: COMMERCIAL

## 2025-05-13 VITALS
BODY MASS INDEX: 32.93 KG/M2 | WEIGHT: 209.8 LBS | OXYGEN SATURATION: 96 % | TEMPERATURE: 97.4 F | SYSTOLIC BLOOD PRESSURE: 120 MMHG | HEART RATE: 90 BPM | DIASTOLIC BLOOD PRESSURE: 83 MMHG | HEIGHT: 67 IN | RESPIRATION RATE: 18 BRPM

## 2025-05-13 DIAGNOSIS — E78.5 DYSLIPIDEMIA: ICD-10-CM

## 2025-05-13 DIAGNOSIS — E28.2 PCOS (POLYCYSTIC OVARIAN SYNDROME): ICD-10-CM

## 2025-05-13 DIAGNOSIS — F41.9 ANXIETY: ICD-10-CM

## 2025-05-13 DIAGNOSIS — E66.09 CLASS 1 OBESITY DUE TO EXCESS CALORIES WITHOUT SERIOUS COMORBIDITY WITH BODY MASS INDEX (BMI) OF 33.0 TO 33.9 IN ADULT: ICD-10-CM

## 2025-05-13 DIAGNOSIS — E66.811 CLASS 1 OBESITY DUE TO EXCESS CALORIES WITHOUT SERIOUS COMORBIDITY WITH BODY MASS INDEX (BMI) OF 33.0 TO 33.9 IN ADULT: ICD-10-CM

## 2025-05-13 DIAGNOSIS — Z00.00 ENCOUNTER FOR WELL ADULT EXAM WITHOUT ABNORMAL FINDINGS: Primary | ICD-10-CM

## 2025-05-13 DIAGNOSIS — K58.2 IRRITABLE BOWEL SYNDROME WITH BOTH CONSTIPATION AND DIARRHEA: ICD-10-CM

## 2025-05-13 DIAGNOSIS — Z86.39 HISTORY OF HYPOTHYROIDISM: ICD-10-CM

## 2025-05-13 PROCEDURE — G8427 DOCREV CUR MEDS BY ELIG CLIN: HCPCS | Performed by: NURSE PRACTITIONER

## 2025-05-13 PROCEDURE — G8417 CALC BMI ABV UP PARAM F/U: HCPCS | Performed by: NURSE PRACTITIONER

## 2025-05-13 PROCEDURE — 1036F TOBACCO NON-USER: CPT | Performed by: NURSE PRACTITIONER

## 2025-05-13 PROCEDURE — 99395 PREV VISIT EST AGE 18-39: CPT | Performed by: NURSE PRACTITIONER

## 2025-05-13 PROCEDURE — 99213 OFFICE O/P EST LOW 20 MIN: CPT | Performed by: NURSE PRACTITIONER

## 2025-05-13 NOTE — PROGRESS NOTES
Well Adult Note  Name: Jennie Handley Today’s Date: 2025   MRN: [id number] Sex: Female   Age: 25 y.o. Ethnicity: Non- / Non    : 2000 Race: White (non-)      Jennie Handley is here for a well adult exam. Women's Health done elsewhere . Dr Sofia.      OC        Assessment & Plan   Jennie was seen today for annual exam.    Diagnoses and all orders for this visit:    Encounter for well adult exam without abnormal findings  -     CBC with Auto Differential; Future  -     Comprehensive Metabolic Panel; Future  -     Hemoglobin A1C; Future  -     Lipid Panel; Future  -     Thyroid Cascade Profile; Future  -     Cortisol AM, Total; Future     IBS-C  A lot of dietary restrictions for symptom control.  Previous evaluation by GI    Class 1 obesity due to excess calories without serious comorbidity with body mass index (BMI) of 33.0 to 33.9 in adult  -     Hemoglobin A1C; Future  -     Cortisol AM, Total; Future  Consultation by endocrinology   Recommended 800 jess diet.    Dyslipidemia  -     Lipid Panel; Future    Anxiety  -     Thyroid Cascade Profile; Future  Followed by psychiatry/ biweekly therapy.  Stable  Trintellix    PCOS (polycystic ovarian syndrome)  -     CBC with Auto Differential; Future  -     Cortisol AM, Total; Future    History of hypothyroidism  -     CBC with Auto Differential; Future  -     Comprehensive Metabolic Panel; Future  -     Cortisol AM, Total; Future      PRN        No follow-ups on file.       Subjective   History:  IBS- C  GI not helpful.    Specialist 1 yr ago.   Test alpha gal  neg.   Was started hyoscyamine  / did not help  tested for small intestinal bacterial overgrowth    Anxiety-  trintellex/  less panic attacks.       Psychiatrist/ Deepika Cano every 2 weeks.  Over  1 yr.  Therapy sessions.     Hypothyroid- dx  2 yrs. Ago resolved  Had been taking  synthroid.  Stopped after several normal thyroid tests    Dermatology-  Minoxidil/ medicated

## 2025-05-13 NOTE — PROGRESS NOTES
I have reviewed all needed documentation in preparation for visit. Verified patient by name and date of birth    Chief Complaint   Patient presents with    Annual Exam       Have you been to the ER, urgent care clinic since your last visit?  Hospitalized since your last visit?   NO    Have you seen or consulted any other health care providers outside our system since your last visit?   NO             Vitals:    05/13/25 0808   BP: 120/83   BP Site: Left Upper Arm   Patient Position: Sitting   BP Cuff Size: Large Adult   Pulse: 90   Resp: 18   Temp: 97.4 °F (36.3 °C)   TempSrc: Temporal   SpO2: 96%   Weight: 95.2 kg (209 lb 12.8 oz)   Height: 1.692 m (5' 6.61\")       Health Maintenance Due   Topic Date Due    Varicella vaccine (1 of 2 - 13+ 2-dose series) Never done    Hepatitis B vaccine (1 of 3 - 19+ 3-dose series) Never done    COVID-19 Vaccine (1 - 2024-25 season) Never done       Malathi Cramer LPN

## 2025-05-21 ENCOUNTER — HOSPITAL ENCOUNTER (OUTPATIENT)
Facility: HOSPITAL | Age: 25
Discharge: HOME | End: 2025-05-24

## 2025-05-21 DIAGNOSIS — E66.09 CLASS 1 OBESITY DUE TO EXCESS CALORIES WITHOUT SERIOUS COMORBIDITY WITH BODY MASS INDEX (BMI) OF 33.0 TO 33.9 IN ADULT: ICD-10-CM

## 2025-05-21 DIAGNOSIS — E78.5 DYSLIPIDEMIA: ICD-10-CM

## 2025-05-21 DIAGNOSIS — E28.2 PCOS (POLYCYSTIC OVARIAN SYNDROME): ICD-10-CM

## 2025-05-21 DIAGNOSIS — E66.811 CLASS 1 OBESITY DUE TO EXCESS CALORIES WITHOUT SERIOUS COMORBIDITY WITH BODY MASS INDEX (BMI) OF 33.0 TO 33.9 IN ADULT: ICD-10-CM

## 2025-05-21 DIAGNOSIS — F41.9 ANXIETY: ICD-10-CM

## 2025-05-21 DIAGNOSIS — Z86.39 HISTORY OF HYPOTHYROIDISM: ICD-10-CM

## 2025-05-21 DIAGNOSIS — Z00.00 ENCOUNTER FOR WELL ADULT EXAM WITHOUT ABNORMAL FINDINGS: ICD-10-CM

## 2025-05-21 LAB
ALBUMIN SERPL-MCNC: 3.2 G/DL (ref 3.5–5)
ALBUMIN/GLOB SERPL: 0.9 (ref 1.1–2.2)
ALP SERPL-CCNC: 70 U/L (ref 45–117)
ALT SERPL-CCNC: 17 U/L (ref 12–78)
ANION GAP SERPL CALC-SCNC: 8 MMOL/L (ref 2–12)
AST SERPL-CCNC: 14 U/L (ref 15–37)
BASOPHILS # BLD: 0.03 K/UL (ref 0–0.1)
BASOPHILS NFR BLD: 0.4 % (ref 0–1)
BILIRUB SERPL-MCNC: 0.2 MG/DL (ref 0.2–1)
BUN SERPL-MCNC: 15 MG/DL (ref 6–20)
BUN/CREAT SERPL: 17 (ref 12–20)
CALCIUM SERPL-MCNC: 8.9 MG/DL (ref 8.5–10.1)
CHLORIDE SERPL-SCNC: 107 MMOL/L (ref 97–108)
CHOLEST SERPL-MCNC: 203 MG/DL
CO2 SERPL-SCNC: 24 MMOL/L (ref 21–32)
CORTIS AM PEAK SERPL-MCNC: 25.7 UG/DL (ref 4.3–22.45)
CREAT SERPL-MCNC: 0.9 MG/DL (ref 0.55–1.02)
DIFFERENTIAL METHOD BLD: NORMAL
EOSINOPHIL # BLD: 0.17 K/UL (ref 0–0.4)
EOSINOPHIL NFR BLD: 2.2 % (ref 0–7)
ERYTHROCYTE [DISTWIDTH] IN BLOOD BY AUTOMATED COUNT: 12.2 % (ref 11.5–14.5)
EST. AVERAGE GLUCOSE BLD GHB EST-MCNC: 91 MG/DL
GLOBULIN SER CALC-MCNC: 3.6 G/DL (ref 2–4)
GLUCOSE SERPL-MCNC: 84 MG/DL (ref 65–100)
HBA1C MFR BLD: 4.8 % (ref 4–5.6)
HCT VFR BLD AUTO: 39.4 % (ref 35–47)
HDLC SERPL-MCNC: 58 MG/DL
HDLC SERPL: 3.5 (ref 0–5)
HGB BLD-MCNC: 13.7 G/DL (ref 11.5–16)
IMM GRANULOCYTES # BLD AUTO: 0.02 K/UL (ref 0–0.04)
IMM GRANULOCYTES NFR BLD AUTO: 0.3 % (ref 0–0.5)
LDLC SERPL CALC-MCNC: 79.2 MG/DL (ref 0–100)
LYMPHOCYTES # BLD: 2.95 K/UL (ref 0.8–3.5)
LYMPHOCYTES NFR BLD: 38 % (ref 12–49)
MCH RBC QN AUTO: 29.5 PG (ref 26–34)
MCHC RBC AUTO-ENTMCNC: 34.8 G/DL (ref 30–36.5)
MCV RBC AUTO: 84.7 FL (ref 80–99)
MONOCYTES # BLD: 0.58 K/UL (ref 0–1)
MONOCYTES NFR BLD: 7.5 % (ref 5–13)
NEUTS SEG # BLD: 4.02 K/UL (ref 1.8–8)
NEUTS SEG NFR BLD: 51.6 % (ref 32–75)
NRBC # BLD: 0 K/UL (ref 0–0.01)
NRBC BLD-RTO: 0 PER 100 WBC
PLATELET # BLD AUTO: 333 K/UL (ref 150–400)
PMV BLD AUTO: 9.4 FL (ref 8.9–12.9)
POTASSIUM SERPL-SCNC: 4.4 MMOL/L (ref 3.5–5.1)
PROT SERPL-MCNC: 6.8 G/DL (ref 6.4–8.2)
RBC # BLD AUTO: 4.65 M/UL (ref 3.8–5.2)
SODIUM SERPL-SCNC: 139 MMOL/L (ref 136–145)
TRIGL SERPL-MCNC: 329 MG/DL
VLDLC SERPL CALC-MCNC: 65.8 MG/DL
WBC # BLD AUTO: 7.8 K/UL (ref 3.6–11)

## 2025-05-22 LAB
Lab: NORMAL
T4 FREE SERPL-MCNC: 0.94 NG/DL (ref 0.82–1.77)
THYROID PEROXIDASE ANTIBODY: 17 IU/ML (ref 0–34)
TSH SERPL DL<=0.05 MIU/L-ACNC: 9.44 UIU/ML (ref 0.45–4.5)

## 2025-05-26 ENCOUNTER — RESULTS FOLLOW-UP (OUTPATIENT)
Age: 25
End: 2025-05-26

## 2025-05-26 DIAGNOSIS — E03.9 ACQUIRED HYPOTHYROIDISM: Primary | ICD-10-CM

## 2025-05-26 RX ORDER — LEVOTHYROXINE SODIUM 25 UG/1
25 TABLET ORAL DAILY
Qty: 90 TABLET | Refills: 1 | Status: SHIPPED | OUTPATIENT
Start: 2025-05-26

## 2025-08-15 ENCOUNTER — TELEMEDICINE (OUTPATIENT)
Age: 25
End: 2025-08-15
Payer: COMMERCIAL

## 2025-08-15 DIAGNOSIS — K52.9 GASTROENTERITIS: Primary | ICD-10-CM

## 2025-08-15 DIAGNOSIS — R11.0 NAUSEA: ICD-10-CM

## 2025-08-15 PROCEDURE — 99213 OFFICE O/P EST LOW 20 MIN: CPT | Performed by: NURSE PRACTITIONER

## 2025-08-15 PROCEDURE — G8427 DOCREV CUR MEDS BY ELIG CLIN: HCPCS | Performed by: NURSE PRACTITIONER

## 2025-08-15 PROCEDURE — G8417 CALC BMI ABV UP PARAM F/U: HCPCS | Performed by: NURSE PRACTITIONER

## 2025-08-15 PROCEDURE — 1036F TOBACCO NON-USER: CPT | Performed by: NURSE PRACTITIONER

## 2025-08-15 RX ORDER — ONDANSETRON 4 MG/1
4 TABLET, ORALLY DISINTEGRATING ORAL 3 TIMES DAILY PRN
Qty: 30 TABLET | Refills: 0 | Status: SHIPPED | OUTPATIENT
Start: 2025-08-15